# Patient Record
Sex: FEMALE | Race: WHITE | NOT HISPANIC OR LATINO | Employment: OTHER | URBAN - METROPOLITAN AREA
[De-identification: names, ages, dates, MRNs, and addresses within clinical notes are randomized per-mention and may not be internally consistent; named-entity substitution may affect disease eponyms.]

---

## 2017-01-13 ENCOUNTER — PROCEDURE ONLY (OUTPATIENT)
Dept: URBAN - METROPOLITAN AREA CLINIC 26 | Facility: CLINIC | Age: 82
End: 2017-01-13

## 2017-01-13 DIAGNOSIS — H34.8320: ICD-10-CM

## 2017-01-13 PROCEDURE — 67028 INJECTION EYE DRUG: CPT

## 2017-01-13 ASSESSMENT — VISUAL ACUITY
OS_CC: 20/70-2
OD_CC: 20/30-2

## 2017-01-13 ASSESSMENT — TONOMETRY
OS_IOP_MMHG: 13
OD_IOP_MMHG: 12

## 2017-02-17 ENCOUNTER — PROCEDURE ONLY (OUTPATIENT)
Dept: URBAN - METROPOLITAN AREA CLINIC 26 | Facility: CLINIC | Age: 82
End: 2017-02-17

## 2017-02-17 DIAGNOSIS — H34.8320: ICD-10-CM

## 2017-02-17 PROCEDURE — 67028 INJECTION EYE DRUG: CPT

## 2017-02-17 ASSESSMENT — TONOMETRY
OD_IOP_MMHG: 16
OS_IOP_MMHG: 14

## 2017-02-17 ASSESSMENT — VISUAL ACUITY
OD_CC: 20/40+1
OS_CC: 20/60-2

## 2017-03-17 ENCOUNTER — FOLLOW UP AND POST INJECTION EVALUATION (OUTPATIENT)
Dept: URBAN - METROPOLITAN AREA CLINIC 26 | Facility: CLINIC | Age: 82
End: 2017-03-17

## 2017-03-17 VITALS
BODY MASS INDEX: 21.6 KG/M2 | HEIGHT: 60 IN | SYSTOLIC BLOOD PRESSURE: 138 MMHG | DIASTOLIC BLOOD PRESSURE: 76 MMHG | WEIGHT: 110 LBS | HEART RATE: 68 BPM

## 2017-03-17 DIAGNOSIS — H33.002: ICD-10-CM

## 2017-03-17 DIAGNOSIS — H44.23: ICD-10-CM

## 2017-03-17 DIAGNOSIS — H43.811: ICD-10-CM

## 2017-03-17 DIAGNOSIS — H34.8320: ICD-10-CM

## 2017-03-17 DIAGNOSIS — H35.62: ICD-10-CM

## 2017-03-17 DIAGNOSIS — H35.3132: ICD-10-CM

## 2017-03-17 PROCEDURE — 92235 FLUORESCEIN ANGRPH MLTIFRAME: CPT

## 2017-03-17 PROCEDURE — 92014 COMPRE OPH EXAM EST PT 1/>: CPT

## 2017-03-17 PROCEDURE — 92250 FUNDUS PHOTOGRAPHY W/I&R: CPT

## 2017-03-17 ASSESSMENT — VISUAL ACUITY
OS_CC: 20/70-2
OD_CC: 20/30-1

## 2017-03-17 ASSESSMENT — TONOMETRY
OD_IOP_MMHG: 12
OS_IOP_MMHG: 12

## 2017-03-31 ENCOUNTER — CLINIC PROCEDURE ONLY (OUTPATIENT)
Dept: URBAN - METROPOLITAN AREA CLINIC 26 | Facility: CLINIC | Age: 82
End: 2017-03-31

## 2017-03-31 DIAGNOSIS — H34.8320: ICD-10-CM

## 2017-03-31 PROCEDURE — 67028 INJECTION EYE DRUG: CPT

## 2017-03-31 ASSESSMENT — VISUAL ACUITY
OD_CC: 20/30-2
OS_CC: 20/50-2

## 2017-03-31 ASSESSMENT — TONOMETRY
OS_IOP_MMHG: 15
OD_IOP_MMHG: 14

## 2017-07-14 NOTE — PATIENT DISCUSSION
Start 3 days prior to surgery- use 4x per day for two weeks, then 2x per day for 2 weeks, then discontinue.

## 2017-11-21 ENCOUNTER — FOLLOW UP (OUTPATIENT)
Dept: URBAN - METROPOLITAN AREA CLINIC 26 | Facility: CLINIC | Age: 82
End: 2017-11-21

## 2017-11-21 VITALS — SYSTOLIC BLOOD PRESSURE: 144 MMHG | DIASTOLIC BLOOD PRESSURE: 79 MMHG | HEIGHT: 55 IN | HEART RATE: 71 BPM

## 2017-11-21 DIAGNOSIS — H35.3132: ICD-10-CM

## 2017-11-21 DIAGNOSIS — H34.8320: ICD-10-CM

## 2017-11-21 DIAGNOSIS — H43.811: ICD-10-CM

## 2017-11-21 DIAGNOSIS — H04.123: ICD-10-CM

## 2017-11-21 DIAGNOSIS — H44.2C2: ICD-10-CM

## 2017-11-21 DIAGNOSIS — H44.23: ICD-10-CM

## 2017-11-21 DIAGNOSIS — H35.62: ICD-10-CM

## 2017-11-21 PROCEDURE — 92250 FUNDUS PHOTOGRAPHY W/I&R: CPT

## 2017-11-21 PROCEDURE — 92235 FLUORESCEIN ANGRPH MLTIFRAME: CPT

## 2017-11-21 PROCEDURE — 92014 COMPRE OPH EXAM EST PT 1/>: CPT

## 2017-11-21 ASSESSMENT — TONOMETRY
OD_IOP_MMHG: 13
OS_IOP_MMHG: 12

## 2017-11-21 ASSESSMENT — VISUAL ACUITY
OS_PH: 20/80+2
OD_CC: 20/30-2
OS_CC: 20/80+1

## 2017-12-01 ENCOUNTER — CLINIC PROCEDURE ONLY (OUTPATIENT)
Dept: URBAN - METROPOLITAN AREA CLINIC 26 | Facility: CLINIC | Age: 82
End: 2017-12-01

## 2017-12-01 DIAGNOSIS — H34.8320: ICD-10-CM

## 2017-12-01 PROCEDURE — 67028 INJECTION EYE DRUG: CPT

## 2017-12-01 ASSESSMENT — VISUAL ACUITY
OD_CC: 20/30+1
OS_CC: 20/70+2

## 2017-12-01 ASSESSMENT — TONOMETRY
OD_IOP_MMHG: 15
OS_IOP_MMHG: 17

## 2018-01-11 NOTE — PATIENT DISCUSSION
Vitreous Syneresis Counseling: I have discussed the diagnosis of vitreous syneresis with the patient and the possibility of a posterior vitreous detachment (PVD), retinal tear or detachment. The signs/symptoms of a PVD, retinal tear or detachment were reviewed to include but not limited to redness, discharge, pain, increase or change in flashes of light, increase or change in floaters, decreased vision, part of the vision missing, veils or any other ocular concerns. I have further explained not all patients who develop a tear or detachment have notable symptoms, therefore, compliance with return visits are necessary. Return for follow-up as scheduled.

## 2018-01-12 ENCOUNTER — FOLLOW UP AND POST INJECTION EVALUATION (OUTPATIENT)
Dept: URBAN - METROPOLITAN AREA CLINIC 26 | Facility: CLINIC | Age: 83
End: 2018-01-12

## 2018-01-12 VITALS
HEART RATE: 72 BPM | DIASTOLIC BLOOD PRESSURE: 78 MMHG | BODY MASS INDEX: 18.85 KG/M2 | SYSTOLIC BLOOD PRESSURE: 122 MMHG | HEIGHT: 60 IN | WEIGHT: 96 LBS

## 2018-01-12 DIAGNOSIS — H34.8320: ICD-10-CM

## 2018-01-12 DIAGNOSIS — H35.62: ICD-10-CM

## 2018-01-12 DIAGNOSIS — H43.811: ICD-10-CM

## 2018-01-12 DIAGNOSIS — H44.23: ICD-10-CM

## 2018-01-12 DIAGNOSIS — H44.2C2: ICD-10-CM

## 2018-01-12 DIAGNOSIS — H35.3132: ICD-10-CM

## 2018-01-12 PROCEDURE — 92134 CPTRZ OPH DX IMG PST SGM RTA: CPT

## 2018-01-12 PROCEDURE — 92014 COMPRE OPH EXAM EST PT 1/>: CPT

## 2018-01-12 ASSESSMENT — VISUAL ACUITY
OD_CC: 20/30-1
OD_PH: 20/25
OS_PH: 20/60
OS_CC: 20/80+2

## 2018-01-12 ASSESSMENT — TONOMETRY
OD_IOP_MMHG: 14
OS_IOP_MMHG: 18

## 2018-03-02 ENCOUNTER — FOLLOW UP (OUTPATIENT)
Dept: URBAN - METROPOLITAN AREA CLINIC 26 | Facility: CLINIC | Age: 83
End: 2018-03-02

## 2018-03-02 VITALS — SYSTOLIC BLOOD PRESSURE: 126 MMHG | HEIGHT: 55 IN | DIASTOLIC BLOOD PRESSURE: 76 MMHG | HEART RATE: 78 BPM

## 2018-03-02 DIAGNOSIS — H43.811: ICD-10-CM

## 2018-03-02 DIAGNOSIS — H44.2C2: ICD-10-CM

## 2018-03-02 DIAGNOSIS — H34.8320: ICD-10-CM

## 2018-03-02 DIAGNOSIS — H35.3132: ICD-10-CM

## 2018-03-02 DIAGNOSIS — H35.62: ICD-10-CM

## 2018-03-02 DIAGNOSIS — H44.23: ICD-10-CM

## 2018-03-02 PROCEDURE — 92250 FUNDUS PHOTOGRAPHY W/I&R: CPT

## 2018-03-02 PROCEDURE — 92012 INTRM OPH EXAM EST PATIENT: CPT

## 2018-03-02 PROCEDURE — 92134 CPTRZ OPH DX IMG PST SGM RTA: CPT

## 2018-03-02 ASSESSMENT — TONOMETRY
OD_IOP_MMHG: 14
OS_IOP_MMHG: 16

## 2018-03-02 ASSESSMENT — VISUAL ACUITY
OS_CC: 20/60-2
OD_PH: 20/25-1
OD_CC: 20/40+1

## 2018-03-13 NOTE — PATIENT DISCUSSION
WARMD:  NEW RX FOR GLASSES GIVEN TODAY TO CORRRRECT VISION AS MUCH AS POSSIBLE.   CONTINUE TO SEE DR Fredis Aquino FOR TREATMENT,  WILL RE REFRACT IN A FEW MONTHS IF DR Fredis Aquino FEELS THAT THERE MAY BE IMPROVEMENT

## 2018-04-06 ENCOUNTER — CLINICAL PROCEDURE AND DIAGNOSTIC TESTING ONLY (OUTPATIENT)
Dept: URBAN - METROPOLITAN AREA CLINIC 26 | Facility: CLINIC | Age: 83
End: 2018-04-06

## 2018-04-06 DIAGNOSIS — H35.3132: ICD-10-CM

## 2018-04-06 DIAGNOSIS — H34.8320: ICD-10-CM

## 2018-04-06 PROCEDURE — 92134 CPTRZ OPH DX IMG PST SGM RTA: CPT

## 2018-04-06 PROCEDURE — 92250 FUNDUS PHOTOGRAPHY W/I&R: CPT

## 2018-04-06 PROCEDURE — 67028 INJECTION EYE DRUG: CPT

## 2018-04-06 ASSESSMENT — VISUAL ACUITY
OS_CC: 20/100-2
OD_CC: 20/30+1

## 2018-04-06 ASSESSMENT — TONOMETRY
OD_IOP_MMHG: 14
OS_IOP_MMHG: 15

## 2018-04-13 ENCOUNTER — UNSCHEDULED FOLLOW UP (OUTPATIENT)
Dept: URBAN - METROPOLITAN AREA CLINIC 26 | Facility: CLINIC | Age: 83
End: 2018-04-13

## 2018-04-13 VITALS — DIASTOLIC BLOOD PRESSURE: 78 MMHG | SYSTOLIC BLOOD PRESSURE: 137 MMHG | HEART RATE: 65 BPM | HEIGHT: 55 IN

## 2018-04-13 DIAGNOSIS — H31.402: ICD-10-CM

## 2018-04-13 DIAGNOSIS — H43.811: ICD-10-CM

## 2018-04-13 DIAGNOSIS — H35.3132: ICD-10-CM

## 2018-04-13 DIAGNOSIS — H44.23: ICD-10-CM

## 2018-04-13 DIAGNOSIS — H34.8320: ICD-10-CM

## 2018-04-13 DIAGNOSIS — H18.59: ICD-10-CM

## 2018-04-13 DIAGNOSIS — H44.2C2: ICD-10-CM

## 2018-04-13 DIAGNOSIS — H35.62: ICD-10-CM

## 2018-04-13 PROCEDURE — 92014 COMPRE OPH EXAM EST PT 1/>: CPT

## 2018-04-13 PROCEDURE — 92250 FUNDUS PHOTOGRAPHY W/I&R: CPT

## 2018-04-13 ASSESSMENT — VISUAL ACUITY
OD_CC: 20/40+1
OS_CC: 20/100

## 2018-04-13 ASSESSMENT — TONOMETRY
OS_IOP_MMHG: 14
OD_IOP_MMHG: 10

## 2018-04-20 ENCOUNTER — FOLLOW UP (OUTPATIENT)
Dept: URBAN - METROPOLITAN AREA CLINIC 26 | Facility: CLINIC | Age: 83
End: 2018-04-20

## 2018-04-20 DIAGNOSIS — H18.59: ICD-10-CM

## 2018-04-20 DIAGNOSIS — H44.2C2: ICD-10-CM

## 2018-04-20 DIAGNOSIS — H34.8320: ICD-10-CM

## 2018-04-20 DIAGNOSIS — H35.62: ICD-10-CM

## 2018-04-20 DIAGNOSIS — H31.402: ICD-10-CM

## 2018-04-20 DIAGNOSIS — H43.811: ICD-10-CM

## 2018-04-20 DIAGNOSIS — H35.3132: ICD-10-CM

## 2018-04-20 PROCEDURE — 92250 FUNDUS PHOTOGRAPHY W/I&R: CPT

## 2018-04-20 PROCEDURE — 92012 INTRM OPH EXAM EST PATIENT: CPT

## 2018-04-20 ASSESSMENT — TONOMETRY
OS_IOP_MMHG: 14
OD_IOP_MMHG: 12

## 2018-04-20 ASSESSMENT — VISUAL ACUITY
OS_CC: 20/80-1
OD_CC: 20/40-2

## 2018-07-26 NOTE — PATIENT DISCUSSION
STABLE NON-EXUDATIVE AMD, OS: CONTINUE AREDS 2 VITAMINS / AMSLER GRID QD/ UV PROTECTION. SMOKING AVOIDANCE REVIEWED. RETURN FOR FOLLOW-UP AS SCHEDULED.

## 2018-10-18 NOTE — PATIENT DISCUSSION
EARLY NON-EXUDATIVE DRY AMD OS:  NOT NECESSARY FOR PATIENT TO TAKE AREDS 2 VITAMINS AT THIS TIME. RECOMMEND HOME MONITORING OF VISION WITH AMSLER GRID AND USE OF UV PROTECTION. SMOKING AVOIDANCE REVIEWED. RETURN FOR FOLLOW-UP AS SCHEDULED.

## 2018-11-08 ENCOUNTER — FOLLOW UP (OUTPATIENT)
Dept: URBAN - METROPOLITAN AREA CLINIC 26 | Facility: CLINIC | Age: 83
End: 2018-11-08

## 2018-11-08 VITALS — SYSTOLIC BLOOD PRESSURE: 122 MMHG | HEIGHT: 55 IN | DIASTOLIC BLOOD PRESSURE: 60 MMHG | HEART RATE: 68 BPM

## 2018-11-08 DIAGNOSIS — H31.402: ICD-10-CM

## 2018-11-08 DIAGNOSIS — H35.62: ICD-10-CM

## 2018-11-08 DIAGNOSIS — H44.23: ICD-10-CM

## 2018-11-08 DIAGNOSIS — H18.59: ICD-10-CM

## 2018-11-08 DIAGNOSIS — H44.2C2: ICD-10-CM

## 2018-11-08 DIAGNOSIS — H34.8320: ICD-10-CM

## 2018-11-08 DIAGNOSIS — H43.811: ICD-10-CM

## 2018-11-08 PROCEDURE — 92014 COMPRE OPH EXAM EST PT 1/>: CPT

## 2018-11-08 PROCEDURE — 92235 FLUORESCEIN ANGRPH MLTIFRAME: CPT

## 2018-11-08 PROCEDURE — 92250 FUNDUS PHOTOGRAPHY W/I&R: CPT

## 2018-11-08 ASSESSMENT — VISUAL ACUITY
OS_CC: 20/70-1
OD_CC: 20/30-1

## 2018-11-08 ASSESSMENT — TONOMETRY
OS_IOP_MMHG: 13
OD_IOP_MMHG: 16

## 2018-11-20 ENCOUNTER — CLINIC PROCEDURE ONLY (OUTPATIENT)
Dept: URBAN - METROPOLITAN AREA CLINIC 26 | Facility: CLINIC | Age: 83
End: 2018-11-20

## 2018-11-20 DIAGNOSIS — H34.8320: ICD-10-CM

## 2018-11-20 PROCEDURE — 67028 INJECTION EYE DRUG: CPT

## 2018-11-20 ASSESSMENT — VISUAL ACUITY
OS_CC: 20/100-2
OD_CC: 20/40+1

## 2018-11-20 ASSESSMENT — TONOMETRY
OS_IOP_MMHG: 16
OD_IOP_MMHG: 14

## 2019-01-24 ENCOUNTER — FOLLOW UP AND POST INJECTION EVALUATION (OUTPATIENT)
Dept: URBAN - METROPOLITAN AREA CLINIC 26 | Facility: CLINIC | Age: 84
End: 2019-01-24

## 2019-01-24 VITALS — WEIGHT: 102 LBS | BODY MASS INDEX: 20.03 KG/M2 | HEIGHT: 60 IN

## 2019-01-24 DIAGNOSIS — H18.59: ICD-10-CM

## 2019-01-24 DIAGNOSIS — H34.8320: ICD-10-CM

## 2019-01-24 DIAGNOSIS — H31.402: ICD-10-CM

## 2019-01-24 DIAGNOSIS — H44.23: ICD-10-CM

## 2019-01-24 DIAGNOSIS — H40.052: ICD-10-CM

## 2019-01-24 DIAGNOSIS — H35.62: ICD-10-CM

## 2019-01-24 DIAGNOSIS — H43.811: ICD-10-CM

## 2019-01-24 DIAGNOSIS — H44.2C2: ICD-10-CM

## 2019-01-24 PROCEDURE — 92250 FUNDUS PHOTOGRAPHY W/I&R: CPT

## 2019-01-24 PROCEDURE — 92014 COMPRE OPH EXAM EST PT 1/>: CPT

## 2019-01-24 RX ORDER — BRIMONIDINE TARTRATE 1.5 MG/ML: 1 SOLUTION/ DROPS OPHTHALMIC TWICE A DAY

## 2019-01-24 ASSESSMENT — TONOMETRY
OS_IOP_MMHG: 26
OD_IOP_MMHG: 16
OS_IOP_MMHG: 25

## 2019-01-24 ASSESSMENT — VISUAL ACUITY
OS_CC: 20/80-2
OD_CC: 20/50+2

## 2019-03-21 ENCOUNTER — FOLLOW UP AND POST INJECTION EVALUATION (OUTPATIENT)
Dept: URBAN - METROPOLITAN AREA CLINIC 26 | Facility: CLINIC | Age: 84
End: 2019-03-21

## 2019-03-21 DIAGNOSIS — H31.402: ICD-10-CM

## 2019-03-21 DIAGNOSIS — H35.62: ICD-10-CM

## 2019-03-21 DIAGNOSIS — H44.2C2: ICD-10-CM

## 2019-03-21 DIAGNOSIS — H34.8320: ICD-10-CM

## 2019-03-21 DIAGNOSIS — H40.052: ICD-10-CM

## 2019-03-21 DIAGNOSIS — H44.23: ICD-10-CM

## 2019-03-21 DIAGNOSIS — H43.811: ICD-10-CM

## 2019-03-21 DIAGNOSIS — H18.59: ICD-10-CM

## 2019-03-21 PROCEDURE — 92250 FUNDUS PHOTOGRAPHY W/I&R: CPT

## 2019-03-21 PROCEDURE — 92014 COMPRE OPH EXAM EST PT 1/>: CPT

## 2019-03-21 PROCEDURE — 92134 CPTRZ OPH DX IMG PST SGM RTA: CPT

## 2019-03-21 ASSESSMENT — VISUAL ACUITY
OS_CC: 20/80+2
OD_CC: 20/30-2
OD_PH: 20/30+2

## 2019-03-21 ASSESSMENT — TONOMETRY
OD_IOP_MMHG: 11
OS_IOP_MMHG: 18

## 2019-03-29 ENCOUNTER — CLINIC PROCEDURE ONLY (OUTPATIENT)
Dept: URBAN - METROPOLITAN AREA CLINIC 26 | Facility: CLINIC | Age: 84
End: 2019-03-29

## 2019-03-29 DIAGNOSIS — H34.8320: ICD-10-CM

## 2019-03-29 PROCEDURE — 67028 INJECTION EYE DRUG: CPT

## 2019-03-29 ASSESSMENT — VISUAL ACUITY
OS_CC: 20/80
OD_CC: 20/40

## 2019-03-29 ASSESSMENT — TONOMETRY
OD_IOP_MMHG: 16
OS_IOP_MMHG: 16

## 2019-05-02 NOTE — PATIENT DISCUSSION
STABLE NON-EXUDATIVE AMD, OS: CONTINUE *CHEWABLE* AREDS 2 VITAMINS / AMSLER GRID QD/ UV PROTECTION. SMOKING AVOIDANCE REVIEWED. RETURN FOR FOLLOW-UP AS SCHEDULED.

## 2019-08-05 NOTE — PATIENT DISCUSSION
CLEARED FOR YAG LASER OD. VISUALLY SIGNIFICANT IMPACT ON PERIPHERAL VISION. 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

## 2019-08-23 NOTE — PATIENT DISCUSSION
Posterior Capsular Fibrosis/Opacification Counseling: I have discussed the option of glasses versus YAG laser surgery versus  following. It was explained that when vision no longer meets the patient's needs, and when a new prescription for glasses is not likely to improve the patient's visual symptoms, the option of YAG laser surgery is a reasonable next step. It was  explained that there is no guarantee that removing the PCF/PCO will improve their visual symptoms. The risks, benefits and alternatives of surgery were discussed with the patient. The uncommon risk of an increase in intraocular pressure or a retinal detachment and their associated symptoms were explained to the patient. After this discussion, the patient desires to proceed with YAG laser to improve vision.

## 2019-08-23 NOTE — PATIENT DISCUSSION
THE PATIENT UNDERSTANDS THAT HIS VISION WILL ALWAYS BE LIMITED BY ARMD, HE HAS BEEN CLEARED BY RETINA SPECIALIST DR. GARCIA TO PROCEED WITH YAG LASER OD. 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

## 2019-10-02 NOTE — PATIENT DISCUSSION
S/P YAG, OD - DOING WELL. UPDATED MRX GIVEN TODAY; UNDERSTANDS MINIMAL CHANGE IN RX AND VISUAL ACUITY 2' DISCIFORM SCAR. CONTINUE TO MONITOR W/ DR. Talavera Delay AS DIRECTED. RTC PRN.

## 2019-11-19 ENCOUNTER — FOLLOW UP (OUTPATIENT)
Dept: URBAN - METROPOLITAN AREA CLINIC 26 | Facility: CLINIC | Age: 84
End: 2019-11-19

## 2019-11-19 VITALS — HEART RATE: 70 BPM | DIASTOLIC BLOOD PRESSURE: 76 MMHG | SYSTOLIC BLOOD PRESSURE: 108 MMHG | HEIGHT: 55 IN

## 2019-11-19 DIAGNOSIS — H44.23: ICD-10-CM

## 2019-11-19 DIAGNOSIS — H34.8320: ICD-10-CM

## 2019-11-19 DIAGNOSIS — H31.402: ICD-10-CM

## 2019-11-19 DIAGNOSIS — H40.052: ICD-10-CM

## 2019-11-19 DIAGNOSIS — H35.62: ICD-10-CM

## 2019-11-19 DIAGNOSIS — H43.811: ICD-10-CM

## 2019-11-19 DIAGNOSIS — H18.59: ICD-10-CM

## 2019-11-19 DIAGNOSIS — H44.2C2: ICD-10-CM

## 2019-11-19 PROCEDURE — 92250 FUNDUS PHOTOGRAPHY W/I&R: CPT

## 2019-11-19 PROCEDURE — 92014 COMPRE OPH EXAM EST PT 1/>: CPT

## 2019-11-19 ASSESSMENT — VISUAL ACUITY
OS_CC: 20/60-2
OD_CC: 20/30-2

## 2019-11-19 ASSESSMENT — TONOMETRY
OD_IOP_MMHG: 12
OS_IOP_MMHG: 11

## 2020-01-23 ENCOUNTER — FOLLOW UP (OUTPATIENT)
Dept: URBAN - METROPOLITAN AREA CLINIC 26 | Facility: CLINIC | Age: 85
End: 2020-01-23

## 2020-01-23 VITALS — BODY MASS INDEX: 20.03 KG/M2 | HEIGHT: 60 IN | WEIGHT: 102 LBS

## 2020-01-23 DIAGNOSIS — H40.052: ICD-10-CM

## 2020-01-23 DIAGNOSIS — H44.23: ICD-10-CM

## 2020-01-23 DIAGNOSIS — H31.402: ICD-10-CM

## 2020-01-23 DIAGNOSIS — H35.62: ICD-10-CM

## 2020-01-23 DIAGNOSIS — H44.2C2: ICD-10-CM

## 2020-01-23 DIAGNOSIS — H18.59: ICD-10-CM

## 2020-01-23 DIAGNOSIS — H34.8320: ICD-10-CM

## 2020-01-23 DIAGNOSIS — H43.811: ICD-10-CM

## 2020-01-23 PROCEDURE — 92250 FUNDUS PHOTOGRAPHY W/I&R: CPT

## 2020-01-23 PROCEDURE — 92014 COMPRE OPH EXAM EST PT 1/>: CPT

## 2020-01-23 PROCEDURE — 92134 CPTRZ OPH DX IMG PST SGM RTA: CPT

## 2020-01-23 ASSESSMENT — TONOMETRY
OD_IOP_MMHG: 14
OS_IOP_MMHG: 13

## 2020-01-23 ASSESSMENT — VISUAL ACUITY
OS_CC: 20/80+2
OD_CC: 20/30-2

## 2020-01-28 ENCOUNTER — CLINIC PROCEDURE ONLY (OUTPATIENT)
Dept: URBAN - METROPOLITAN AREA CLINIC 26 | Facility: CLINIC | Age: 85
End: 2020-01-28

## 2020-01-28 DIAGNOSIS — H34.8320: ICD-10-CM

## 2020-01-28 PROCEDURE — 67028 INJECTION EYE DRUG: CPT

## 2020-01-28 ASSESSMENT — TONOMETRY: OS_IOP_MMHG: 14

## 2020-01-28 ASSESSMENT — VISUAL ACUITY: OS_CC: 20/70+1

## 2020-03-24 ENCOUNTER — FOLLOW UP AND POST INJECTION EVALUATION (OUTPATIENT)
Dept: URBAN - METROPOLITAN AREA CLINIC 26 | Facility: CLINIC | Age: 85
End: 2020-03-24

## 2020-03-24 DIAGNOSIS — H44.23: ICD-10-CM

## 2020-03-24 DIAGNOSIS — H43.811: ICD-10-CM

## 2020-03-24 DIAGNOSIS — H18.59: ICD-10-CM

## 2020-03-24 DIAGNOSIS — H35.62: ICD-10-CM

## 2020-03-24 DIAGNOSIS — H40.052: ICD-10-CM

## 2020-03-24 DIAGNOSIS — H34.8320: ICD-10-CM

## 2020-03-24 DIAGNOSIS — H31.402: ICD-10-CM

## 2020-03-24 DIAGNOSIS — H44.2C2: ICD-10-CM

## 2020-03-24 PROCEDURE — 92250 FUNDUS PHOTOGRAPHY W/I&R: CPT

## 2020-03-24 PROCEDURE — 92014 COMPRE OPH EXAM EST PT 1/>: CPT

## 2020-03-24 ASSESSMENT — TONOMETRY
OD_IOP_MMHG: 14
OS_IOP_MMHG: 16

## 2020-03-24 ASSESSMENT — VISUAL ACUITY
OD_CC: 20/30+1
OS_CC: 20/80+1

## 2020-04-09 NOTE — PATIENT DISCUSSION
AMD (WET), OD:  DISCIFORM SCAR. REVIEWED RISK AND BENEFITS OF TREAT AND EXTEND REGIMEN VS PRN TREATMENT. THE PATIENT PREFERS TO CONTINUE WITH PRN TREATMENT.

## 2020-04-09 NOTE — PATIENT DISCUSSION
Fluorescein Angiography Results:  OD -- central scar hyperfluorescent staining; paracentral pigment blocking; no macular leakage. OS -- central CNV complex 0.8 DD with well circumbscribed borders with classic leakage pattern.

## 2020-04-09 NOTE — PATIENT DISCUSSION
Fundus Photography Results:  OD -- media clear; disc sharp; central yellow-gray disciform scar; RPE clumping; no hemorrhages. OS -- media clear; disc sharp; central brown-gray subretinal membrane with macular edema; drusen large.

## 2020-11-03 ENCOUNTER — FOLLOW UP AND POST INJECTION EVALUATION (OUTPATIENT)
Dept: URBAN - METROPOLITAN AREA CLINIC 26 | Facility: CLINIC | Age: 85
End: 2020-11-03

## 2020-11-03 DIAGNOSIS — H40.052: ICD-10-CM

## 2020-11-03 DIAGNOSIS — H44.2C2: ICD-10-CM

## 2020-11-03 DIAGNOSIS — H18.599: ICD-10-CM

## 2020-11-03 DIAGNOSIS — H34.8320: ICD-10-CM

## 2020-11-03 DIAGNOSIS — H44.23: ICD-10-CM

## 2020-11-03 DIAGNOSIS — H31.402: ICD-10-CM

## 2020-11-03 DIAGNOSIS — H35.62: ICD-10-CM

## 2020-11-03 DIAGNOSIS — H43.811: ICD-10-CM

## 2020-11-03 PROCEDURE — 92014 COMPRE OPH EXAM EST PT 1/>: CPT

## 2020-11-03 PROCEDURE — 92250 FUNDUS PHOTOGRAPHY W/I&R: CPT

## 2020-11-03 ASSESSMENT — TONOMETRY
OD_IOP_MMHG: 15
OS_IOP_MMHG: 13

## 2020-11-03 ASSESSMENT — VISUAL ACUITY
OS_CC: 20/60-2
OD_CC: 20/30+2

## 2021-01-12 ENCOUNTER — FOLLOW UP (OUTPATIENT)
Dept: URBAN - METROPOLITAN AREA CLINIC 26 | Facility: CLINIC | Age: 86
End: 2021-01-12

## 2021-01-12 DIAGNOSIS — H44.23: ICD-10-CM

## 2021-01-12 DIAGNOSIS — H31.402: ICD-10-CM

## 2021-01-12 DIAGNOSIS — H43.811: ICD-10-CM

## 2021-01-12 DIAGNOSIS — H40.052: ICD-10-CM

## 2021-01-12 DIAGNOSIS — H18.599: ICD-10-CM

## 2021-01-12 DIAGNOSIS — H35.62: ICD-10-CM

## 2021-01-12 DIAGNOSIS — H44.2C2: ICD-10-CM

## 2021-01-12 DIAGNOSIS — H34.8320: ICD-10-CM

## 2021-01-12 PROCEDURE — 92250 FUNDUS PHOTOGRAPHY W/I&R: CPT

## 2021-01-12 PROCEDURE — 92014 COMPRE OPH EXAM EST PT 1/>: CPT

## 2021-01-12 ASSESSMENT — TONOMETRY
OD_IOP_MMHG: 16
OS_IOP_MMHG: 14

## 2021-01-12 ASSESSMENT — VISUAL ACUITY
OD_CC: 20/30+1
OS_CC: 20/80

## 2021-03-25 ENCOUNTER — FOLLOW UP (OUTPATIENT)
Dept: URBAN - METROPOLITAN AREA CLINIC 26 | Facility: CLINIC | Age: 86
End: 2021-03-25

## 2021-03-25 VITALS — HEIGHT: 60 IN | WEIGHT: 109 LBS | BODY MASS INDEX: 21.4 KG/M2

## 2021-03-25 DIAGNOSIS — H18.599: ICD-10-CM

## 2021-03-25 DIAGNOSIS — H34.8320: ICD-10-CM

## 2021-03-25 DIAGNOSIS — H35.62: ICD-10-CM

## 2021-03-25 DIAGNOSIS — H43.811: ICD-10-CM

## 2021-03-25 DIAGNOSIS — H31.402: ICD-10-CM

## 2021-03-25 DIAGNOSIS — H40.052: ICD-10-CM

## 2021-03-25 DIAGNOSIS — H44.2C2: ICD-10-CM

## 2021-03-25 DIAGNOSIS — H44.23: ICD-10-CM

## 2021-03-25 PROCEDURE — 92250 FUNDUS PHOTOGRAPHY W/I&R: CPT

## 2021-03-25 PROCEDURE — 92014 COMPRE OPH EXAM EST PT 1/>: CPT

## 2021-03-25 ASSESSMENT — TONOMETRY
OD_IOP_MMHG: 14
OS_IOP_MMHG: 12

## 2021-03-25 ASSESSMENT — VISUAL ACUITY
OS_CC: 20/80-2
OS_PH: 20/70-2
OD_CC: 20/30-2

## 2021-10-22 NOTE — PATIENT DISCUSSION
AMD (WET), OS:  AVASTIN (1.25MG) INTRAVITREAL INJECTION. RETURN AS SCHEDULED. Hearing Aid Follow-Up    Cristina Lewis was seen today for a hearing aid check appointment. The patient wears Oticon MORE  ( 2(85), dome 8 mm double vent, retention line) in both ears. The patient reports her left aid has no sound.     Otoscopy revealed minimal, non-occluding cerumen in both ears.    The hearing aids were cleaned and checked. Both filters were very plugged up with wax. I reviewed how to change the filters and domes with patient. She was able to do this on her own during the appointment. Gave her domes and filters for supplies at home. Retention lines changed. Post cleaning, good sound.     Patient will follow up as needed. Has an appointment on 12/10/21. She was made aware that I will not be here at that time. She did not want to cancel, she said she will call to reschedule.    No charge, in warranty through 5/2024.    Rosibel Oviedo, CCC-A  Clinical Audiologist

## 2021-10-29 ENCOUNTER — FOLLOW UP (OUTPATIENT)
Dept: URBAN - METROPOLITAN AREA CLINIC 26 | Facility: CLINIC | Age: 86
End: 2021-10-29

## 2021-10-29 DIAGNOSIS — H40.052: ICD-10-CM

## 2021-10-29 DIAGNOSIS — H18.599: ICD-10-CM

## 2021-10-29 DIAGNOSIS — H34.8320: ICD-10-CM

## 2021-10-29 DIAGNOSIS — H43.811: ICD-10-CM

## 2021-10-29 DIAGNOSIS — H35.62: ICD-10-CM

## 2021-10-29 DIAGNOSIS — H31.402: ICD-10-CM

## 2021-10-29 DIAGNOSIS — H44.2C2: ICD-10-CM

## 2021-10-29 PROCEDURE — 92134 CPTRZ OPH DX IMG PST SGM RTA: CPT

## 2021-10-29 PROCEDURE — 92014 COMPRE OPH EXAM EST PT 1/>: CPT

## 2021-10-29 ASSESSMENT — VISUAL ACUITY
OD_CC: 20/30-2
OS_CC: 20/80+2

## 2021-10-29 ASSESSMENT — TONOMETRY
OS_IOP_MMHG: 15
OD_IOP_MMHG: 15

## 2022-03-22 ENCOUNTER — FOLLOW UP (OUTPATIENT)
Dept: URBAN - METROPOLITAN AREA CLINIC 26 | Facility: CLINIC | Age: 87
End: 2022-03-22

## 2022-03-22 VITALS — HEIGHT: 60 IN | WEIGHT: 108 LBS | BODY MASS INDEX: 21.2 KG/M2

## 2022-03-22 DIAGNOSIS — H44.23: ICD-10-CM

## 2022-03-22 DIAGNOSIS — H43.811: ICD-10-CM

## 2022-03-22 DIAGNOSIS — H34.8320: ICD-10-CM

## 2022-03-22 DIAGNOSIS — H40.052: ICD-10-CM

## 2022-03-22 DIAGNOSIS — H44.2C2: ICD-10-CM

## 2022-03-22 DIAGNOSIS — H31.402: ICD-10-CM

## 2022-03-22 DIAGNOSIS — H35.62: ICD-10-CM

## 2022-03-22 PROCEDURE — 92250 FUNDUS PHOTOGRAPHY W/I&R: CPT

## 2022-03-22 PROCEDURE — 92134 CPTRZ OPH DX IMG PST SGM RTA: CPT

## 2022-03-22 PROCEDURE — 92014 COMPRE OPH EXAM EST PT 1/>: CPT

## 2022-03-22 ASSESSMENT — TONOMETRY
OS_IOP_MMHG: 18
OD_IOP_MMHG: 18

## 2022-03-22 ASSESSMENT — VISUAL ACUITY
OD_CC: 20/25+2
OS_CC: 20/80+2

## 2023-01-05 ENCOUNTER — FOLLOW UP (OUTPATIENT)
Dept: URBAN - METROPOLITAN AREA CLINIC 26 | Facility: CLINIC | Age: 88
End: 2023-01-05

## 2023-01-05 VITALS
HEART RATE: 77 BPM | WEIGHT: 106 LBS | SYSTOLIC BLOOD PRESSURE: 134 MMHG | HEIGHT: 60 IN | BODY MASS INDEX: 20.81 KG/M2 | DIASTOLIC BLOOD PRESSURE: 77 MMHG

## 2023-01-05 DIAGNOSIS — H31.402: ICD-10-CM

## 2023-01-05 DIAGNOSIS — H34.8320: ICD-10-CM

## 2023-01-05 DIAGNOSIS — H44.2C2: ICD-10-CM

## 2023-01-05 DIAGNOSIS — H40.052: ICD-10-CM

## 2023-01-05 DIAGNOSIS — H44.23: ICD-10-CM

## 2023-01-05 DIAGNOSIS — H18.599: ICD-10-CM

## 2023-01-05 DIAGNOSIS — H35.62: ICD-10-CM

## 2023-01-05 DIAGNOSIS — H43.811: ICD-10-CM

## 2023-01-05 DIAGNOSIS — H04.123: ICD-10-CM

## 2023-01-05 PROCEDURE — 92134 CPTRZ OPH DX IMG PST SGM RTA: CPT

## 2023-01-05 PROCEDURE — 92014 COMPRE OPH EXAM EST PT 1/>: CPT

## 2023-01-05 PROCEDURE — 92250 FUNDUS PHOTOGRAPHY W/I&R: CPT

## 2023-01-05 ASSESSMENT — TONOMETRY
OD_IOP_MMHG: 15
OS_IOP_MMHG: 14

## 2023-01-05 ASSESSMENT — VISUAL ACUITY
OS_CC: 20/80-2
OD_CC: 20/40+1

## 2023-11-16 ENCOUNTER — COMPREHENSIVE EXAM (OUTPATIENT)
Dept: URBAN - METROPOLITAN AREA CLINIC 26 | Facility: CLINIC | Age: 88
End: 2023-11-16

## 2023-11-16 DIAGNOSIS — H44.2C2: ICD-10-CM

## 2023-11-16 DIAGNOSIS — H44.23: ICD-10-CM

## 2023-11-16 DIAGNOSIS — H31.402: ICD-10-CM

## 2023-11-16 DIAGNOSIS — H35.62: ICD-10-CM

## 2023-11-16 DIAGNOSIS — H43.811: ICD-10-CM

## 2023-11-16 DIAGNOSIS — H04.123: ICD-10-CM

## 2023-11-16 DIAGNOSIS — H18.599: ICD-10-CM

## 2023-11-16 DIAGNOSIS — H40.052: ICD-10-CM

## 2023-11-16 DIAGNOSIS — H34.8320: ICD-10-CM

## 2023-11-16 PROCEDURE — 92014 COMPRE OPH EXAM EST PT 1/>: CPT

## 2023-11-16 PROCEDURE — 92250 FUNDUS PHOTOGRAPHY W/I&R: CPT

## 2023-11-16 PROCEDURE — 92134 CPTRZ OPH DX IMG PST SGM RTA: CPT

## 2023-11-16 ASSESSMENT — TONOMETRY
OD_IOP_MMHG: 15
OS_IOP_MMHG: 12

## 2023-11-16 ASSESSMENT — VISUAL ACUITY
OD_SC: 20/60-2
OS_SC: 20/60-2

## 2024-03-18 ENCOUNTER — COMPREHENSIVE EXAM (OUTPATIENT)
Dept: URBAN - METROPOLITAN AREA CLINIC 26 | Facility: CLINIC | Age: 89
End: 2024-03-18

## 2024-03-18 DIAGNOSIS — H44.2C2: ICD-10-CM

## 2024-03-18 DIAGNOSIS — H43.811: ICD-10-CM

## 2024-03-18 DIAGNOSIS — H35.62: ICD-10-CM

## 2024-03-18 DIAGNOSIS — H18.599: ICD-10-CM

## 2024-03-18 DIAGNOSIS — H44.23: ICD-10-CM

## 2024-03-18 DIAGNOSIS — H04.123: ICD-10-CM

## 2024-03-18 DIAGNOSIS — H34.8320: ICD-10-CM

## 2024-03-18 DIAGNOSIS — H40.052: ICD-10-CM

## 2024-03-18 DIAGNOSIS — H31.402: ICD-10-CM

## 2024-03-18 PROCEDURE — 92134 CPTRZ OPH DX IMG PST SGM RTA: CPT

## 2024-03-18 PROCEDURE — 92014 COMPRE OPH EXAM EST PT 1/>: CPT

## 2024-03-18 ASSESSMENT — TONOMETRY
OD_IOP_MMHG: 15
OS_IOP_MMHG: 17

## 2024-03-18 ASSESSMENT — VISUAL ACUITY
OD_CC: 20/40
OS_CC: 20/60-2

## 2024-12-17 ENCOUNTER — COMPREHENSIVE EXAM (OUTPATIENT)
Age: 89
End: 2024-12-17

## 2024-12-17 VITALS
BODY MASS INDEX: 19.83 KG/M2 | HEIGHT: 60 IN | HEART RATE: 71 BPM | DIASTOLIC BLOOD PRESSURE: 90 MMHG | SYSTOLIC BLOOD PRESSURE: 162 MMHG | WEIGHT: 101 LBS

## 2024-12-17 DIAGNOSIS — H35.62: ICD-10-CM

## 2024-12-17 DIAGNOSIS — H04.123: ICD-10-CM

## 2024-12-17 DIAGNOSIS — H31.402: ICD-10-CM

## 2024-12-17 DIAGNOSIS — H44.2C2: ICD-10-CM

## 2024-12-17 DIAGNOSIS — H18.599: ICD-10-CM

## 2024-12-17 DIAGNOSIS — H43.811: ICD-10-CM

## 2024-12-17 DIAGNOSIS — H40.052: ICD-10-CM

## 2024-12-17 DIAGNOSIS — H44.23: ICD-10-CM

## 2024-12-17 DIAGNOSIS — H35.3221: ICD-10-CM

## 2024-12-17 DIAGNOSIS — H34.8320: ICD-10-CM

## 2024-12-17 PROCEDURE — J3490AVA AVASTIN *

## 2024-12-17 PROCEDURE — 92134 CPTRZ OPH DX IMG PST SGM RTA: CPT

## 2024-12-17 PROCEDURE — 67028 INJECTION EYE DRUG: CPT

## 2024-12-17 PROCEDURE — 92235 FLUORESCEIN ANGRPH MLTIFRAME: CPT

## 2024-12-17 PROCEDURE — 92250 FUNDUS PHOTOGRAPHY W/I&R: CPT | Mod: 59

## 2024-12-17 PROCEDURE — 92014 COMPRE OPH EXAM EST PT 1/>: CPT | Mod: 25

## 2025-02-04 ENCOUNTER — CLINIC PROCEDURE ONLY (OUTPATIENT)
Age: OVER 89
End: 2025-02-04

## 2025-02-04 DIAGNOSIS — H44.23: ICD-10-CM

## 2025-02-04 DIAGNOSIS — H35.3221: ICD-10-CM

## 2025-02-04 PROCEDURE — 67028 INJECTION EYE DRUG: CPT

## 2025-02-04 PROCEDURE — 92250 FUNDUS PHOTOGRAPHY W/I&R: CPT | Mod: 59

## 2025-02-04 PROCEDURE — 92134 CPTRZ OPH DX IMG PST SGM RTA: CPT

## 2025-02-04 PROCEDURE — J3490AVA AVASTIN *

## 2025-03-11 ENCOUNTER — CLINIC PROCEDURE ONLY (OUTPATIENT)
Age: OVER 89
End: 2025-03-11

## 2025-03-11 DIAGNOSIS — H44.23: ICD-10-CM

## 2025-03-11 DIAGNOSIS — H35.3221: ICD-10-CM

## 2025-03-11 PROCEDURE — 92134 CPTRZ OPH DX IMG PST SGM RTA: CPT

## 2025-03-11 PROCEDURE — 67028 INJECTION EYE DRUG: CPT

## 2025-03-11 PROCEDURE — 92250 FUNDUS PHOTOGRAPHY W/I&R: CPT | Mod: 59

## 2025-03-11 PROCEDURE — J3490AVA AVASTIN *

## 2025-04-08 ENCOUNTER — CLINIC PROCEDURE ONLY (OUTPATIENT)
Age: OVER 89
End: 2025-04-08

## 2025-04-08 DIAGNOSIS — H35.3221: ICD-10-CM

## 2025-04-08 DIAGNOSIS — H44.23: ICD-10-CM

## 2025-04-08 PROCEDURE — 92134 CPTRZ OPH DX IMG PST SGM RTA: CPT

## 2025-04-08 PROCEDURE — J3490AVA AVASTIN *

## 2025-04-08 PROCEDURE — 67028 INJECTION EYE DRUG: CPT
